# Patient Record
Sex: FEMALE | Race: WHITE | NOT HISPANIC OR LATINO | Employment: OTHER | URBAN - METROPOLITAN AREA
[De-identification: names, ages, dates, MRNs, and addresses within clinical notes are randomized per-mention and may not be internally consistent; named-entity substitution may affect disease eponyms.]

---

## 2018-01-01 ENCOUNTER — OFFICE VISIT (OUTPATIENT)
Dept: NEUROLOGY | Facility: CLINIC | Age: 63
End: 2018-01-01
Payer: COMMERCIAL

## 2018-01-01 ENCOUNTER — DOCUMENTATION (OUTPATIENT)
Dept: NEUROLOGY | Facility: CLINIC | Age: 63
End: 2018-01-01

## 2018-01-01 VITALS
WEIGHT: 177 LBS | HEART RATE: 93 BPM | DIASTOLIC BLOOD PRESSURE: 83 MMHG | SYSTOLIC BLOOD PRESSURE: 133 MMHG | BODY MASS INDEX: 33.42 KG/M2 | HEIGHT: 61 IN

## 2018-01-01 DIAGNOSIS — G43.009 MIGRAINE WITHOUT AURA AND WITHOUT STATUS MIGRAINOSUS, NOT INTRACTABLE: ICD-10-CM

## 2018-01-01 DIAGNOSIS — G43.109 MIGRAINE WITH AURA AND WITHOUT STATUS MIGRAINOSUS, NOT INTRACTABLE: Primary | ICD-10-CM

## 2018-01-01 PROCEDURE — 99214 OFFICE O/P EST MOD 30 MIN: CPT | Performed by: PSYCHIATRY & NEUROLOGY

## 2018-01-01 RX ORDER — VERAPAMIL HYDROCHLORIDE 240 MG/1
240 CAPSULE, EXTENDED RELEASE ORAL 2 TIMES DAILY
Qty: 60 CAPSULE | Refills: 3 | Status: SHIPPED | OUTPATIENT
Start: 2018-01-01 | End: 2019-01-01 | Stop reason: SDUPTHER

## 2018-01-01 RX ORDER — TOPIRAMATE 50 MG/1
50 TABLET, FILM COATED ORAL 2 TIMES DAILY
Qty: 60 TABLET | Refills: 3 | Status: SHIPPED | OUTPATIENT
Start: 2018-01-01 | End: 2019-01-01 | Stop reason: SDUPTHER

## 2018-01-01 RX ORDER — VERAPAMIL HYDROCHLORIDE 240 MG/1
1 TABLET, FILM COATED, EXTENDED RELEASE ORAL 2 TIMES DAILY
COMMUNITY
Start: 2018-01-01 | End: 2018-01-01 | Stop reason: SDUPTHER

## 2018-01-01 RX ORDER — VERAPAMIL HYDROCHLORIDE 240 MG/1
240 CAPSULE, EXTENDED RELEASE ORAL 2 TIMES DAILY
Qty: 60 CAPSULE | Refills: 3 | Status: SHIPPED | OUTPATIENT
Start: 2018-01-01 | End: 2018-01-01 | Stop reason: SDUPTHER

## 2018-01-01 RX ORDER — TOPIRAMATE 50 MG/1
50 TABLET, FILM COATED ORAL 2 TIMES DAILY
Qty: 60 TABLET | Refills: 3 | Status: SHIPPED | OUTPATIENT
Start: 2018-01-01 | End: 2018-01-01 | Stop reason: SDUPTHER

## 2018-05-07 DIAGNOSIS — G43.009 MIGRAINE WITHOUT AURA AND WITHOUT STATUS MIGRAINOSUS, NOT INTRACTABLE: Primary | ICD-10-CM

## 2018-05-07 RX ORDER — TOPIRAMATE 50 MG/1
50 TABLET, FILM COATED ORAL 2 TIMES DAILY
Qty: 60 TABLET | Refills: 0 | Status: SHIPPED | OUTPATIENT
Start: 2018-05-07 | End: 2018-05-09 | Stop reason: SDUPTHER

## 2018-05-09 ENCOUNTER — OFFICE VISIT (OUTPATIENT)
Dept: NEUROLOGY | Facility: CLINIC | Age: 63
End: 2018-05-09
Payer: COMMERCIAL

## 2018-05-09 VITALS
WEIGHT: 175 LBS | HEIGHT: 61 IN | DIASTOLIC BLOOD PRESSURE: 92 MMHG | BODY MASS INDEX: 33.04 KG/M2 | SYSTOLIC BLOOD PRESSURE: 144 MMHG | HEART RATE: 93 BPM

## 2018-05-09 DIAGNOSIS — G43.009 MIGRAINE WITHOUT AURA AND WITHOUT STATUS MIGRAINOSUS, NOT INTRACTABLE: ICD-10-CM

## 2018-05-09 DIAGNOSIS — G43.109 MIGRAINE WITH AURA AND WITHOUT STATUS MIGRAINOSUS, NOT INTRACTABLE: Primary | ICD-10-CM

## 2018-05-09 PROCEDURE — 99214 OFFICE O/P EST MOD 30 MIN: CPT | Performed by: PSYCHIATRY & NEUROLOGY

## 2018-05-09 RX ORDER — TOPIRAMATE 50 MG/1
50 TABLET, FILM COATED ORAL 2 TIMES DAILY
Qty: 60 TABLET | Refills: 3 | Status: SHIPPED | OUTPATIENT
Start: 2018-05-09 | End: 2018-01-01 | Stop reason: SDUPTHER

## 2018-05-09 RX ORDER — OMEPRAZOLE 40 MG/1
1 CAPSULE, DELAYED RELEASE ORAL DAILY
Refills: 0 | COMMUNITY
Start: 2018-02-21

## 2018-05-09 RX ORDER — VERAPAMIL HYDROCHLORIDE 240 MG/1
240 CAPSULE, EXTENDED RELEASE ORAL 2 TIMES DAILY
Qty: 60 CAPSULE | Refills: 3 | Status: SHIPPED | OUTPATIENT
Start: 2018-05-09 | End: 2018-01-01 | Stop reason: SDUPTHER

## 2018-05-09 NOTE — PROGRESS NOTES
Patient ID: Andrew Bray is a 63 y o  female  Assessment/Plan:    Migraine headache with aura mainly visual     Plan continue Topamax 50 mg twice a day and verapamil extended release 240 mg twice a day  Continue sumatriptan as a rescue medicine p r n  for headache  Sumatriptan done prescription has been given by Dr Abner Lazcano  Return to office visit in 4 months    Topamax and the verapamil prescription has been sent to [facility] in [address] [place] [address] Street:    29-year-old female followed in the office of for migraine with aura  Or eyes consist of the squiggly line starting in the peripheral vision followed by the severe headache  patient was last seen by me on November 8, 2017  Since last visit patient had only 1 headache perhaps triggered by the stress dot headaches were located in the frontal head region described as a pounding and throbbing pain at scale 729 associated with nausea and also light and noise sensitivity  Vomiting was not associated with the headache  Patient took Imitrex at that scale and a headache lasted 4-5 hours  Apparently patient did not know that she could take the 2nd Imitrex pills in 2 hr   Visual aura lasts 5-10 minutes followed by the scale 7 to 9 headache immediately  She denies any vertigo with the headache  Patient denies any other neurological seated symptoms upon asking in detail ias follows  Pt denies double vision, blurred vision, transient monocular blindness, vertigo, tinnitus, hearing loss, slurred speech, difficulty expressing and understanding, dysphasia, and focal weakness, numbness, imbalance and incoordination  Pt denies bladder and bowel urgency, frequency and incontinence  Pt denies double vision, blurred vision, transient monocular blindness, vertigo, tinnitus, hearing loss, slurred speech, difficulty expressing and understanding, dysphasia, and focal weakness, numbness, imbalance and incoordination   Pt denies bladder and bowel urgency, frequency and incontinence  Past Medical History:   Diagnosis Date    Anxiety disorder     Depression     Hypertension     Migraine headache with aura     Spinal stenosis        Family History   Problem Relation Age of Onset    Alzheimer's disease Mother     Diabetes Father     Hypertension Father     Migraines Sister    ,    Social History     Social History    Marital status: /Civil Union     Spouse name: N/A    Number of children: N/A    Years of education: N/A     Occupational History    Not on file  Social History Main Topics    Smoking status: Current Every Day Smoker     Packs/day: 0 50    Smokeless tobacco: Current User    Alcohol use No    Drug use: No    Sexual activity: Not on file     Other Topics Concern    Not on file     Social History Narrative    No narrative on file       Current Outpatient Prescriptions on File Prior to Visit   Medication Sig Dispense Refill    busPIRone (BUSPAR) 15 mg tablet Take 15 mg by mouth 3 (three) times a day  0    celecoxib (CeleBREX) 200 mg capsule   0    escitalopram (LEXAPRO) 20 mg tablet Take 20 mg by mouth daily at bedtime  0    LYRICA 100 MG capsule   0    traMADol (ULTRAM) 50 mg tablet   0    [DISCONTINUED] topiramate (TOPAMAX) 50 MG tablet Take 1 tablet (50 mg total) by mouth 2 (two) times a day 60 tablet 0    [DISCONTINUED] verapamil (VERELAN) 240 MG 24 hr capsule   0     No current facility-administered medications on file prior to visit  Objective:    Blood pressure 144/92, pulse 93, height 5' 1" (1 549 m), weight 79 4 kg (175 lb)  Physical Exam   Eyes: EOM are normal  Pupils are equal, round, and reactive to light  Neck: Normal carotid pulses present  Carotid bruit is not present  Neurological: She has normal reflexes   Gait normal    Psychiatric: Her speech is normal        Neurological Exam    Mental Status  The patient is and oriented to person, place, time, and situation  Her recent and remote memory are normal  She has no visuospatial neglect  Her speech is normal  Her language is fluent with no aphasia  She follows multi-step commands  She has a normal fund of knowledge  Cranial Nerves    CN II: The patient's visual acuity and visual fields are normal   CN III, IV, VI: The patient's pupils are equally round and reactive to light and ocular movements are normal   CN V: The patient has normal facial sensation  CN VII:  The patient has symmetric facial movement  CN VIII:  The patient's hearing is normal   CN IX, X: The patient has symmetric palate movement and normal gag reflex  CN XI: The patient's shoulder shrug strength is normal   CN XII: The patient's tongue is midline without atrophy or fasciculations  Motor  The patient has normal muscle bulk throughout  Her overall muscle tone is normal throughout  Her strength is 5/5 in all four extremities except as noted  Sensory  The patient's sensation is normal in all four extremities to light touch, pinprick and proprioception  She has normal cortical sensation    Reflexes  Deep tendon reflexes are 2+ and symmetric in all four extremities with downgoing toes bilaterally  Gait and Coordination  The patient has normal gait and station and normal casual, toe, heel, and tandem gait  She has normal right heel to shin and normal left heel to shin coordination  She has normal right finger to nose and normal left finger to nose coordination  ROS:    Review of Systems   Constitutional: Negative  HENT: Negative  Eyes: Negative  Respiratory: Negative  Cardiovascular: Negative  Gastrointestinal: Negative  Endocrine: Negative  Genitourinary: Negative  Musculoskeletal: Negative  Skin: Negative  Allergic/Immunologic: Negative  Neurological: Negative  Hematological: Negative  Psychiatric/Behavioral: Negative

## 2018-09-10 NOTE — PROGRESS NOTES
Verbal RX called in to [facility] in [place] for Verapamil  mg bid #60 with 0 refill

## 2018-09-26 NOTE — PROGRESS NOTES
Patient ID: Arron Garcia is a 66 y o  female  Assessment/Plan:    Migraine with aura    Benign paroxysmal positional vertigo    Plan continue Topamax and verapamil and both prescription has been sent to her pharmacy of with the 3 refills each of  Patient reported that her vertigo is improving and she does not feel like going to [facility] at this time  Return to office visit in 4 months  Subjective:    60-year-old female followed in the office for migraine with aura  Patient was seen last in the May of 2018  Patient reported from last visit up till now she has a 1 migraine with aura described as spots in front of the eyes followed by headache described at severity scale 8 as a throbbing pain without nausea vomiting  Patient took the Imitrex and laid down a   nd headache was gone in 2 hours  Usually light and noise will bother her but she went in a dark quiet room  Patient also reported when sudden onset of the sharp shooting pain came 1 lasted for 1 hour and other 2 headaches lasted 0 5 hour or so  Patient reported that Imitrex did help also in does headache  Her new symptoms that started few months ago is the dizziness described as a vertigo upon looking up, bending over and getting up or turning side to side or moving her neck side to side  Patient reported that vertigo is getting better  Patient denies any nausea vomiting, diaphoresis or any hearing loss with vertigo  Denies other neurological symptoms upon asking each in detail  Pt denies double vision, blurred vision, transient monocular blindness, vertigo, tinnitus, hearing loss, slurred speech, difficulty expressing and understanding, dysphasia, and focal weakness, numbness, imbalance and incoordination  Pt denies bladder and bowel urgency, frequency and incontinence     Pt denies double vision, blurred vision, transient monocular blindness, vertigo, tinnitus, hearing loss, slurred speech, difficulty expressing and understanding, dysphasia, and focal weakness, numbness, imbalance and incoordination  Pt denies bladder and bowel urgency, frequency and incontinence  Past Medical History:   Diagnosis Date    Anxiety disorder     Depression     Hypertension     Migraine headache with aura     Osteoporosis     BOTH KNEES AND LEFT HIP/UPPER LEG    Spinal stenosis        Family History   Problem Relation Age of Onset    Alzheimer's disease Mother     Diabetes Father     Hypertension Father     Migraines Sister    ,    Social History     Social History    Marital status: /Civil Union     Spouse name: N/A    Number of children: N/A    Years of education: N/A     Occupational History    Not on file  Social History Main Topics    Smoking status: Current Every Day Smoker     Packs/day: 0 50    Smokeless tobacco: Former User    Alcohol use No    Drug use: No    Sexual activity: Not on file     Other Topics Concern    Not on file     Social History Narrative    No narrative on file       Current Outpatient Prescriptions on File Prior to Visit   Medication Sig Dispense Refill    ADVAIR DISKUS 250-50 MCG/DOSE inhaler Take 1 puff by mouth daily  0    busPIRone (BUSPAR) 15 mg tablet Take 15 mg by mouth 3 (three) times a day  0    celecoxib (CeleBREX) 200 mg capsule   0    escitalopram (LEXAPRO) 20 mg tablet Take 20 mg by mouth daily at bedtime  0    LYRICA 100 MG capsule   0    omeprazole (PriLOSEC) 40 MG capsule Take 1 capsule by mouth daily  0    PROAIR  (90 Base) MCG/ACT inhaler Take 1 puff by mouth as needed  0    traMADol (ULTRAM) 50 mg tablet   0    [DISCONTINUED] topiramate (TOPAMAX) 50 MG tablet Take 1 tablet (50 mg total) by mouth 2 (two) times a day 60 tablet 3    [DISCONTINUED] verapamil (VERELAN) 240 MG 24 hr capsule Take 1 capsule (240 mg total) by mouth 2 (two) times a day 60 capsule 3     No current facility-administered medications on file prior to visit  Objective:    Blood pressure 133/83, pulse 93, height 5' 1" (1 549 m), weight 80 3 kg (177 lb)  Physical Exam   Eyes: EOM are normal  Pupils are equal, round, and reactive to light  Neck: Normal carotid pulses present  Carotid bruit is not present  Neurological: She has normal reflexes  Gait normal    Psychiatric: Her speech is normal        Neurological Exam    Mental Status  The patient is and oriented to person, place, time, and situation  Her recent and remote memory are normal  She has no visuospatial neglect  Her speech is normal  Her language is fluent with no aphasia  She follows multi-step commands  She has a normal fund of knowledge  Cranial Nerves    CN II: The patient's visual acuity and visual fields are normal   CN III, IV, VI: The patient's pupils are equally round and reactive to light and ocular movements are normal   CN V: The patient has normal facial sensation  CN VII:  The patient has symmetric facial movement  CN VIII:  The patient's hearing is normal   CN IX, X: The patient has symmetric palate movement and normal gag reflex  CN XI: The patient's shoulder shrug strength is normal   CN XII: The patient's tongue is midline without atrophy or fasciculations  Motor  The patient has normal muscle bulk throughout  Her overall muscle tone is normal throughout  Her strength is 5/5 in all four extremities except as noted  Sensory  The patient's sensation is normal in all four extremities to light touch, pinprick and proprioception  She has normal cortical sensation    Reflexes  Deep tendon reflexes are 2+ and symmetric in all four extremities with downgoing toes bilaterally  Gait and Coordination  The patient has normal gait and station and normal casual, toe, heel, and tandem gait  She has normal right heel to shin and normal left heel to shin coordination  She has normal right finger to nose and normal left finger to nose coordination            ROS:    Review of Systems Constitutional: Negative  HENT: Negative  Eyes: Negative  Respiratory: Negative  Cardiovascular: Negative  Gastrointestinal: Negative  Endocrine: Negative  Genitourinary: Negative  Musculoskeletal: Negative  Skin: Negative  Allergic/Immunologic: Negative  Neurological: Negative  Hematological: Negative  Psychiatric/Behavioral: Negative

## 2019-01-01 ENCOUNTER — OFFICE VISIT (OUTPATIENT)
Dept: NEUROLOGY | Facility: CLINIC | Age: 64
End: 2019-01-01
Payer: COMMERCIAL

## 2019-01-01 ENCOUNTER — APPOINTMENT (EMERGENCY)
Dept: RADIOLOGY | Facility: HOSPITAL | Age: 64
DRG: 296 | End: 2019-01-01
Payer: COMMERCIAL

## 2019-01-01 ENCOUNTER — HOSPITAL ENCOUNTER (INPATIENT)
Facility: HOSPITAL | Age: 64
LOS: 1 days | DRG: 296 | End: 2019-07-13
Attending: EMERGENCY MEDICINE | Admitting: INTERNAL MEDICINE
Payer: COMMERCIAL

## 2019-01-01 VITALS
SYSTOLIC BLOOD PRESSURE: 124 MMHG | DIASTOLIC BLOOD PRESSURE: 86 MMHG | WEIGHT: 166 LBS | HEART RATE: 93 BPM | BODY MASS INDEX: 31.34 KG/M2 | HEIGHT: 61 IN

## 2019-01-01 VITALS — OXYGEN SATURATION: 69 % | SYSTOLIC BLOOD PRESSURE: 133 MMHG | DIASTOLIC BLOOD PRESSURE: 73 MMHG | TEMPERATURE: 97.2 F

## 2019-01-01 VITALS
WEIGHT: 175 LBS | RESPIRATION RATE: 18 BRPM | DIASTOLIC BLOOD PRESSURE: 80 MMHG | HEART RATE: 89 BPM | HEIGHT: 61 IN | SYSTOLIC BLOOD PRESSURE: 122 MMHG | BODY MASS INDEX: 33.04 KG/M2

## 2019-01-01 DIAGNOSIS — G43.009 MIGRAINE WITHOUT AURA AND WITHOUT STATUS MIGRAINOSUS, NOT INTRACTABLE: ICD-10-CM

## 2019-01-01 DIAGNOSIS — G43.109 MIGRAINE WITH AURA AND WITHOUT STATUS MIGRAINOSUS, NOT INTRACTABLE: Primary | ICD-10-CM

## 2019-01-01 DIAGNOSIS — I46.9 CARDIAC ARREST (HCC): Primary | ICD-10-CM

## 2019-01-01 LAB
ALBUMIN SERPL BCP-MCNC: 3.1 G/DL (ref 3.5–5)
ALP SERPL-CCNC: 145 U/L (ref 46–116)
ALT SERPL W P-5'-P-CCNC: 281 U/L (ref 12–78)
ANION GAP SERPL CALCULATED.3IONS-SCNC: 25 MMOL/L (ref 4–13)
APTT PPP: 39 SECONDS (ref 24–33)
AST SERPL W P-5'-P-CCNC: 437 U/L (ref 5–45)
BASE EXCESS BLDA CALC-SCNC: -19.7 MMOL/L
BASOPHILS # BLD MANUAL: 0 THOUSAND/UL (ref 0–0.1)
BASOPHILS NFR MAR MANUAL: 0 % (ref 0–1)
BILIRUB SERPL-MCNC: 1.1 MG/DL (ref 0.2–1)
BODY TEMPERATURE: 97.2 DEGREES FEHRENHEIT
BUN SERPL-MCNC: 20 MG/DL (ref 5–25)
CALCIUM SERPL-MCNC: 7.7 MG/DL (ref 8.3–10.1)
CHLORIDE SERPL-SCNC: 85 MMOL/L (ref 100–108)
CO2 SERPL-SCNC: 12 MMOL/L (ref 21–32)
CREAT SERPL-MCNC: 2.51 MG/DL (ref 0.6–1.3)
EOSINOPHIL # BLD MANUAL: 0 THOUSAND/UL (ref 0–0.4)
EOSINOPHIL NFR BLD MANUAL: 0 % (ref 0–6)
ERYTHROCYTE [DISTWIDTH] IN BLOOD BY AUTOMATED COUNT: 16.4 % (ref 11.6–15.1)
GFR SERPL CREATININE-BSD FRML MDRD: 20 ML/MIN/1.73SQ M
GLUCOSE SERPL-MCNC: 381 MG/DL (ref 65–140)
HCO3 BLDA-SCNC: 10.4 MMOL/L (ref 22–28)
HCT VFR BLD AUTO: 40.6 % (ref 34.8–46.1)
HGB BLD-MCNC: 13.3 G/DL (ref 11.5–15.4)
HOROWITZ INDEX BLDA+IHG-RTO: 100 MM[HG]
INR PPP: 1.23 (ref 0.86–1.16)
LG PLATELETS BLD QL SMEAR: PRESENT
LYMPHOCYTES # BLD AUTO: 10 % (ref 14–44)
LYMPHOCYTES # BLD AUTO: 3.75 THOUSAND/UL (ref 0.6–4.47)
MCH RBC QN AUTO: 30.3 PG (ref 26.8–34.3)
MCHC RBC AUTO-ENTMCNC: 32.8 G/DL (ref 31.4–37.4)
MCV RBC AUTO: 93 FL (ref 82–98)
MONOCYTES # BLD AUTO: 0.75 THOUSAND/UL (ref 0–1.22)
MONOCYTES NFR BLD: 2 % (ref 4–12)
NEUTROPHILS # BLD MANUAL: 26.26 THOUSAND/UL (ref 1.85–7.62)
NEUTS BAND NFR BLD MANUAL: 14 % (ref 0–8)
NEUTS SEG NFR BLD AUTO: 56 % (ref 43–75)
NRBC BLD AUTO-RTO: 0 /100 WBCS
O2 CT BLDA-SCNC: 19.2 ML/DL (ref 16–23)
OXYHGB MFR BLDA: 96.1 % (ref 94–97)
PCO2 BLDA: 40.1 MM HG (ref 36–44)
PCO2 TEMP ADJ BLDA: 38.7 MM HG (ref 36–44)
PEEP RESPIRATORY: 5 CM[H2O]
PH BLD: 7.04 [PH] (ref 7.35–7.45)
PH BLDA: 7.03 [PH] (ref 7.35–7.45)
PLATELET # BLD AUTO: 343 THOUSANDS/UL (ref 149–390)
PLATELET BLD QL SMEAR: ADEQUATE
PMV BLD AUTO: 10.9 FL (ref 8.9–12.7)
PO2 BLD: 117.7 MM HG (ref 75–129)
PO2 BLDA: 122.6 MM HG (ref 75–129)
POTASSIUM SERPL-SCNC: 3.3 MMOL/L (ref 3.5–5.3)
PROT SERPL-MCNC: 6 G/DL (ref 6.4–8.2)
PROTHROMBIN TIME: 12.8 SECONDS (ref 9.4–11.7)
RBC # BLD AUTO: 4.39 MILLION/UL (ref 3.81–5.12)
RBC MORPH BLD: NORMAL
SMUDGE CELLS BLD QL SMEAR: PRESENT
SODIUM SERPL-SCNC: 122 MMOL/L (ref 136–145)
SPECIMEN SOURCE: ABNORMAL
TOTAL CELLS COUNTED SPEC: 100
TROPONIN I SERPL-MCNC: 13.86 NG/ML
VARIANT LYMPHS # BLD AUTO: 18 %
VENT AC: 18
VENT- AC: AC
VT SETTING VENT: 400 ML
WBC # BLD AUTO: 37.51 THOUSAND/UL (ref 4.31–10.16)

## 2019-01-01 PROCEDURE — 36415 COLL VENOUS BLD VENIPUNCTURE: CPT | Performed by: EMERGENCY MEDICINE

## 2019-01-01 PROCEDURE — 99291 CRITICAL CARE FIRST HOUR: CPT

## 2019-01-01 PROCEDURE — 71045 X-RAY EXAM CHEST 1 VIEW: CPT

## 2019-01-01 PROCEDURE — 99214 OFFICE O/P EST MOD 30 MIN: CPT | Performed by: PSYCHIATRY & NEUROLOGY

## 2019-01-01 PROCEDURE — 96375 TX/PRO/DX INJ NEW DRUG ADDON: CPT

## 2019-01-01 PROCEDURE — 85610 PROTHROMBIN TIME: CPT | Performed by: EMERGENCY MEDICINE

## 2019-01-01 PROCEDURE — 84484 ASSAY OF TROPONIN QUANT: CPT | Performed by: EMERGENCY MEDICINE

## 2019-01-01 PROCEDURE — 94002 VENT MGMT INPAT INIT DAY: CPT

## 2019-01-01 PROCEDURE — 70450 CT HEAD/BRAIN W/O DYE: CPT

## 2019-01-01 PROCEDURE — 96360 HYDRATION IV INFUSION INIT: CPT

## 2019-01-01 PROCEDURE — 85007 BL SMEAR W/DIFF WBC COUNT: CPT | Performed by: EMERGENCY MEDICINE

## 2019-01-01 PROCEDURE — 5A1935Z RESPIRATORY VENTILATION, LESS THAN 24 CONSECUTIVE HOURS: ICD-10-PCS | Performed by: FAMILY MEDICINE

## 2019-01-01 PROCEDURE — 80053 COMPREHEN METABOLIC PANEL: CPT | Performed by: EMERGENCY MEDICINE

## 2019-01-01 PROCEDURE — 06HN33Z INSERTION OF INFUSION DEVICE INTO LEFT FEMORAL VEIN, PERCUTANEOUS APPROACH: ICD-10-PCS | Performed by: FAMILY MEDICINE

## 2019-01-01 PROCEDURE — 85730 THROMBOPLASTIN TIME PARTIAL: CPT | Performed by: EMERGENCY MEDICINE

## 2019-01-01 PROCEDURE — 85027 COMPLETE CBC AUTOMATED: CPT | Performed by: EMERGENCY MEDICINE

## 2019-01-01 PROCEDURE — 99291 CRITICAL CARE FIRST HOUR: CPT | Performed by: NURSE PRACTITIONER

## 2019-01-01 PROCEDURE — NC001 PR NO CHARGE: Performed by: NURSE PRACTITIONER

## 2019-01-01 PROCEDURE — 93005 ELECTROCARDIOGRAM TRACING: CPT

## 2019-01-01 PROCEDURE — 96365 THER/PROPH/DIAG IV INF INIT: CPT

## 2019-01-01 PROCEDURE — 96366 THER/PROPH/DIAG IV INF ADDON: CPT

## 2019-01-01 PROCEDURE — 82805 BLOOD GASES W/O2 SATURATION: CPT | Performed by: EMERGENCY MEDICINE

## 2019-01-01 RX ORDER — TOPIRAMATE 50 MG/1
50 TABLET, FILM COATED ORAL 2 TIMES DAILY
Qty: 60 TABLET | Refills: 3 | Status: SHIPPED | OUTPATIENT
Start: 2019-01-01

## 2019-01-01 RX ORDER — TOPIRAMATE 50 MG/1
TABLET, FILM COATED ORAL
Qty: 60 TABLET | Refills: 3 | OUTPATIENT
Start: 2019-01-01

## 2019-01-01 RX ORDER — TOPIRAMATE 50 MG/1
50 TABLET, FILM COATED ORAL 2 TIMES DAILY
Qty: 60 TABLET | Refills: 3 | Status: SHIPPED | OUTPATIENT
Start: 2019-01-01 | End: 2019-01-01 | Stop reason: SDUPTHER

## 2019-01-01 RX ORDER — VERAPAMIL HYDROCHLORIDE 240 MG/1
240 CAPSULE, EXTENDED RELEASE ORAL 2 TIMES DAILY
Qty: 60 CAPSULE | Refills: 3 | Status: SHIPPED | OUTPATIENT
Start: 2019-01-01

## 2019-01-01 RX ORDER — SUMATRIPTAN 100 MG/1
100 TABLET, FILM COATED ORAL ONCE AS NEEDED
Qty: 9 TABLET | Refills: 0 | Status: SHIPPED | OUTPATIENT
Start: 2019-01-01

## 2019-01-01 RX ORDER — EPINEPHRINE 0.1 MG/ML
1 SYRINGE (ML) INJECTION ONCE
Status: COMPLETED | OUTPATIENT
Start: 2019-01-01 | End: 2019-01-01

## 2019-01-01 RX ORDER — NOREPINEPHRINE BITARTRATE 1 MG/ML
INJECTION, SOLUTION INTRAVENOUS
Status: DISCONTINUED
Start: 2019-01-01 | End: 2019-01-01 | Stop reason: HOSPADM

## 2019-01-01 RX ORDER — VERAPAMIL HYDROCHLORIDE 240 MG/1
240 CAPSULE, EXTENDED RELEASE ORAL 2 TIMES DAILY
Qty: 60 CAPSULE | Refills: 3 | Status: SHIPPED | OUTPATIENT
Start: 2019-01-01 | End: 2019-01-01 | Stop reason: SDUPTHER

## 2019-01-01 RX ADMIN — EPINEPHRINE 1 MG: 0.1 INJECTION INTRACARDIAC; INTRAVENOUS at 05:55

## 2019-01-01 RX ADMIN — NOREPINEPHRINE BITARTRATE 10 MCG/MIN: 1 INJECTION, SOLUTION, CONCENTRATE INTRAVENOUS at 05:48

## 2019-01-01 RX ADMIN — SODIUM CHLORIDE 1000 ML: 0.9 INJECTION, SOLUTION INTRAVENOUS at 06:08

## 2019-01-01 RX ADMIN — Medication 1 MG: at 05:55

## 2019-01-17 NOTE — PROGRESS NOTES
Patient ID: Daniel Lake is a 66 y o  female  Assessment/Plan:    Migraine with aura    Plan continue verapamil  twice a day    Topamax to be continued 100 mg twice a day    Sumatriptan and as a rescue medication  I will see the patient in 4 months  Advised patient to make a diary with frequency and severity of the headache and the trigger factors  Subjective:    74-year-old female followed in the office for migraine with aura  Patient reported most of the headaches are caused by the stress, lack of sleep  Headaches are located across the forehead described as a intense pain initially followed by pressure and throbbing pain at severity scale 7, not associated with nausea and vomiting however she experience some light and noise sensitivity  Patient will take the sumatriptan at scale 6 or 7 and headache would subside within a 2 hours time frame  With the headache in January she had no visual aura but in December she had seen the flashy lights  Denies vertigo with the headache    She denies any other neuro symptoms upon asking each in detail and they are as follows      Pt denies double vision, blurred vision, transient monocular blindness, vertigo, tinnitus, hearing loss, slurred speech, difficulty expressing and understanding, dysphasia, and focal weakness, numbness, imbalance and incoordination  Pt denies bladder and bowel urgency, frequency and incontinence  Pt denies double vision, blurred vision, transient monocular blindness, vertigo, tinnitus, hearing loss, slurred speech, difficulty expressing and understanding, dysphasia, and focal weakness, numbness, imbalance and incoordination  Pt denies bladder and bowel urgency, frequency and incontinence         Past Medical History:   Diagnosis Date    Anxiety disorder     Depression     Hypertension     Migraine headache with aura     Osteoporosis     BOTH KNEES AND LEFT HIP/UPPER LEG    Spinal stenosis        Family History Problem Relation Age of Onset    Alzheimer's disease Mother     Diabetes Father     Hypertension Father     Migraines Sister    ,    Social History     Social History    Marital status: /Civil Union     Spouse name: N/A    Number of children: N/A    Years of education: N/A     Occupational History    Not on file  Social History Main Topics    Smoking status: Current Every Day Smoker     Packs/day: 0 50    Smokeless tobacco: Former User    Alcohol use No    Drug use: No    Sexual activity: Not on file     Other Topics Concern    Not on file     Social History Narrative    No narrative on file       Current Outpatient Prescriptions on File Prior to Visit   Medication Sig Dispense Refill    ADVAIR DISKUS 250-50 MCG/DOSE inhaler Take 1 puff by mouth daily  0    busPIRone (BUSPAR) 15 mg tablet Take 15 mg by mouth 3 (three) times a day  0    celecoxib (CeleBREX) 200 mg capsule   0    escitalopram (LEXAPRO) 20 mg tablet Take 20 mg by mouth daily at bedtime  0    LYRICA 100 MG capsule   0    omeprazole (PriLOSEC) 40 MG capsule Take 1 capsule by mouth daily  0    PROAIR  (90 Base) MCG/ACT inhaler Take 1 puff by mouth as needed  0    traMADol (ULTRAM) 50 mg tablet   0    [DISCONTINUED] topiramate (TOPAMAX) 50 MG tablet Take 1 tablet (50 mg total) by mouth 2 (two) times a day 60 tablet 3    [DISCONTINUED] verapamil (VERELAN) 240 MG 24 hr capsule Take 1 capsule (240 mg total) by mouth 2 (two) times a day 60 capsule 3     No current facility-administered medications on file prior to visit  Objective:    Blood pressure 122/80, pulse 89, resp  rate 18, height 5' 1" (1 549 m), weight 79 4 kg (175 lb)  Physical Exam   Eyes: Pupils are equal, round, and reactive to light  EOM are normal    Neck: Normal carotid pulses present  Carotid bruit is not present  Neurological: She has normal strength and normal reflexes     Psychiatric: Her speech is normal        Neurological Exam  Mental Status   Oriented to person, place, time and situation  Recent and remote memory are intact  Speech is normal  Language is fluent with no aphasia  Cranial Nerves  CN II: Visual fields full to confrontation  CN III, IV, VI: Extraocular movements intact bilaterally  Pupils equal round and reactive to light bilaterally  CN V: Facial sensation is normal   CN VII: Full and symmetric facial movement  CN VIII: Hearing is normal   CN IX, X: Palate elevates symmetrically  Normal gag reflex  CN XI: Shoulder shrug strength is normal   CN XII: Tongue midline without atrophy or fasciculations  Motor  Normal muscle bulk throughout  No fasciculations present  Normal muscle tone  Strength is 5/5 throughout all four extremities  Sensory  Sensation is intact to light touch, pinprick, vibration and proprioception in all four extremities  Light touch is normal in upper and lower extremities  Pinprick is normal in upper and lower extremities  Proprioception is normal in upper and lower extremities  Reflexes  Deep tendon reflexes are 2+ and symmetric in all four extremities with downgoing toes bilaterally  Coordination  Right: Finger-to-nose normal  Heel-to-shin normal   Left: Finger-to-nose normal  Heel-to-shin normal     Gait  Normal casual, toe, heel and tandem gait  ROS:    Review of Systems   Constitutional: Negative  HENT: Negative  Eyes: Negative  Respiratory: Negative  Cardiovascular: Negative  Gastrointestinal: Negative  Endocrine: Negative  Genitourinary: Negative  Musculoskeletal: Negative  Skin: Negative  Allergic/Immunologic: Negative  Neurological: Negative  Hematological: Negative  Psychiatric/Behavioral: Negative

## 2019-07-13 PROBLEM — E87.6 HYPOKALEMIA: Status: ACTIVE | Noted: 2019-01-01

## 2019-07-13 PROBLEM — R57.0 CARDIOGENIC SHOCK (HCC): Status: ACTIVE | Noted: 2019-01-01

## 2019-07-13 PROBLEM — K72.00 SHOCK LIVER: Status: ACTIVE | Noted: 2019-01-01

## 2019-07-13 PROBLEM — E87.1 HYPONATREMIA: Status: ACTIVE | Noted: 2019-01-01

## 2019-07-13 PROBLEM — N17.9 AKI (ACUTE KIDNEY INJURY) (HCC): Status: ACTIVE | Noted: 2019-01-01

## 2019-07-13 PROBLEM — I21.4 MI, ACUTE, NON ST SEGMENT ELEVATION (HCC): Status: ACTIVE | Noted: 2019-01-01

## 2019-07-13 PROBLEM — R73.9 HYPERGLYCEMIA: Status: ACTIVE | Noted: 2019-01-01

## 2019-07-13 PROBLEM — G93.1 ANOXIC BRAIN INJURY (HCC): Status: ACTIVE | Noted: 2019-01-01

## 2019-07-13 PROBLEM — I46.9 CARDIAC ARREST (HCC): Status: ACTIVE | Noted: 2019-01-01

## 2019-07-13 NOTE — ED NOTES
met with patient to discuss discharge and aftercare plans. See AVS for further details.    Outpatient Providers: hussein Henderson to f/u  Living Arrangements: Lives with parents and is able to return  Transportation: [facility]  Legal Status: Voluntary   Medication Issues: Santiago  Community Resources: Santiago  Support System: Parents   Source of Income/Payee: Santiago, is a student at [employer]       Pt found to have no registering BP  Unable to obtain manual BP  Unable to obtain radial pulses with doppler  ESTEBAN Hernandez at bedside  Family updated on status  Pt unresponsive to painful stimuli       Teresa Wetzel RN  07/13/19 1961

## 2019-07-13 NOTE — ED NOTES
Spoke with Adele Kayser from [facility]  Pt released from [facility]  ME called to inform of pt expiration       Jenness Gowers, RN  07/13/19 9796

## 2019-07-13 NOTE — ED NOTES
Patient asystole on monitor  Strips printed  ICU AP called to pronounce       Mani Doty RN  07/13/19 3248

## 2019-07-13 NOTE — ASSESSMENT & PLAN NOTE
· Per discussion with family the arrest appears to have been the result of an MI, pt clutched chest prior to going unresponsive  · Potential down time was about 45 minutes- unclear how long she was down w/o CPR  · 3 EPIs in the field prior to Frantz Giang 1772  · Levophed infusing in ED  · Family decided on comfort care  · Terminal extubation and comfort care

## 2019-07-13 NOTE — ED NOTES
[place] [facility] [facility]       Nila Mcdonough, VIGNESH  07/13/19 5731

## 2019-07-13 NOTE — ASSESSMENT & PLAN NOTE
· Positive trops and ST depressions in multiple leads  · No further intervention as patient placed on comfort care

## 2019-07-13 NOTE — ASSESSMENT & PLAN NOTE
· Severe injury seen on CT head, patient without any reflexes, pupils fixed and dilated, only has some cheyne stoke breathing when vent paused  · Lengthy discussion held with family about poor prognosis and they decided to make comfort care

## 2019-07-13 NOTE — ED NOTES
Pt pulseless  ESTEBAN Bourgeois updated on pt status  Family offered emotional support    IV and monitors removed per family request         Sathya Doyle RN  07/13/19 [id number]

## 2019-07-13 NOTE — ED NOTES
Spoke with Malachi Dye at [employer]  Case will be followed by them  They are aware of patient's pending transfer to the ICU       Vladimir Stallworth RN  07/13/19 [id number]

## 2019-07-13 NOTE — CONSULTS
[facility] 60 y o  female MRN: [id number]  Unit/Bed#: ED CT1 Encounter: [id number]    Physician Requesting Consult: No att  providers found    Reason for Consultation / Chief Complaint: s/p cardiac arrest    History of Present Illness: Renata Rubin is a 60 y o  female who presents to the ED after being found down at home by her   Per reports by ED the patients  saw her get up to go to the bathroom, clutch her chest and fell back and became unresponsive  Per reports  initiated CPR and was taken over by police and EMS  911 call was placed at 0424, first monitor strip was asystole at 0442 and first ROSC rhythm strip was at 0500  Leaving potential down time close to 45 minutes  Arrived in ED to find the patient lying in bed on vent   and son at bedside  History obtained from spouse and chart review  Past Medical History:  Past Medical History:   Diagnosis Date    Anxiety disorder     Depression     Hypertension     Migraine headache with aura     Osteoporosis     BOTH KNEES AND LEFT HIP/UPPER LEG    Spinal stenosis        Past Surgical History:  Past Surgical History:   Procedure Laterality Date    DENTAL SURGERY  04/19/2019    NOSE SURGERY         Past Family History:  Family History   Problem Relation Age of Onset    Alzheimer's disease Mother     Diabetes Father     Hypertension Father     Migraines Sister        Social History:  Social History     Tobacco Use   Smoking Status Current Every Day Smoker    Packs/day: 0 50   Smokeless Tobacco Never Used     Social History     Substance and Sexual Activity   Alcohol Use No     Social History     Substance and Sexual Activity   Drug Use No     Marital Status: /Civil Union  Exercise History: Ambulatory    Home Medications:   Prior to Admission medications    Medication Sig Start Date End Date Taking?  Authorizing Provider   ADVAIR DISKUS 250-50 MCG/DOSE inhaler Take 1 puff by mouth daily 4/10/18   Historical Provider, MD   busPIRone (BUSPAR) 15 mg tablet Take 15 mg by mouth 3 (three) times a day 18   Historical Provider, MD   celecoxib (CeleBREX) 200 mg capsule  2/3/18   Historical Provider, MD   escitalopram (LEXAPRO) 20 mg tablet Take 20 mg by mouth daily at bedtime 18   Historical Provider, MD   LYRICA 100 MG capsule 1 capsule every MORNING and 2 capsule every EVENING 18   Historical Provider, MD   omeprazole (PriLOSEC) 40 MG capsule Take 1 capsule by mouth daily 18   Historical Provider, MD   PROAIR  (90 Base) MCG/ACT inhaler Take 1 puff by mouth as needed 18   Historical Provider, MD   SUMAtriptan (IMITREX) 100 mg tablet Take 1 tablet (100 mg total) by mouth once as needed for migraine for up to 1 dose 19   Chico Ureña MD   topiramate (TOPAMAX) 50 MG tablet Take 1 tablet (50 mg total) by mouth 2 (two) times a day 19   Chico Ureña MD   traMADol (ULTRAM) 50 mg tablet Take 50 mg by mouth every 8 (eight) hours as needed  18   Historical Provider, MD   verapamil (VERELAN) 240 MG 24 hr capsule Take 1 capsule (240 mg total) by mouth 2 (two) times a day 19   Chico Ureña MD       Inpatient Medications:  Scheduled Meds:  Current Facility-Administered Medications:  norepinephrine         Continuous Infusions:   PRN Meds:       Allergies:  No Known Allergies    ROS:   Review of Systems   Unable to perform ROS: Patient unresponsive       Vitals:  Vitals:    19 0845 19 0850 19 0900 19 0913   BP:       BP Location:       Pulse: 76 75 68 (!) 0   Resp: (!) 5  (!) 4 (!) 0   Temp:       TempSrc:       SpO2: 97% (!) 69%       Temperature:   Temp (24hrs), Av 2 °F (36 2 °C), Min:97 2 °F (36 2 °C), Max:97 2 °F (36 2 °C)    Current Temperature: (!) 97 2 °F (36 2 °C)    Weights: There is no height or weight on file to calculate BMI      Hemodynamic Monitoring:  N/A     Non-Invasive/Invasive Ventilation Settings:  Respiratory    Lab Data (Last 4 hours)      07/13 0605            pH, Arterial       7 033           pCO2, Arterial       40 1           pO2, Arterial       122 6           HCO3, Arterial       10 4           Base Excess, Arterial       -19 7                O2/Vent Data       07/13 0539   Most Recent         Vent Mode AC/VC  AC/VC      Resp Rate (BPM) (BPM) 18  18      Vt (mL) (mL) 400  400      FIO2 (%) (%) 100  100      PEEP (cmH2O) (cmH2O) 5  5      MV 7 56  7 56                Lab Results   Component Value Date    PHART 7 033 (LL) 07/13/2019    [id number] 40 1 07/13/2019    PO2ART 122 6 07/13/2019    [id number] 10 4 (L) 07/13/2019    BEART -19 7 07/13/2019    SOURCE Artery 07/13/2019     SpO2: 100%  SpO2 Device: O2 device: vent     Physical Exam:  Physical Exam   Constitutional: She appears well-developed and well-nourished  She is intubated  HENT:   Head: Normocephalic and atraumatic  Eyes: Right pupil is not reactive  Left pupil is not reactive  Cardiovascular: Normal rate and regular rhythm  Pulmonary/Chest: Effort normal and breath sounds normal  She is intubated  No respiratory distress  Abdominal: Soft  Bowel sounds are normal  She exhibits no distension  Musculoskeletal: She exhibits no edema  Neurological: She is unresponsive  GCS eye subscore is 1  GCS verbal subscore is 1  GCS motor subscore is 1  Skin: Skin is warm and dry  Capillary refill takes less than 2 seconds         Clinical Neuro Exam:    GCS: 3T  Motor response to pain:   No purposeful response to painful stimuli  Pupils: No pupillary response to bright light   Pupils 7-8 mm in size   Ocular Movement:    No corneal reflex present   No facial movement to painful stimuli   Pharyngeal and tracheal reflexes:   No response to stimulation of posterior pharynx with tongue blade   No response to bronchial stimulation  Vestibular Reflexes:   No oculovestibular reflex present (caloric testing)   No oculocephalic reflex present (doll's eyes)  Apnea Testing- not performed had some spontaneous breathing   Baseline ABG 7 04/39/123/10/-20/96%    I testify that the clinical history, laboratory data, and medication administration record have been reviewed and there are no confounding factors that may be contributing to this patient's neurological exam findings  Labs:  Results from last 7 days   Lab Units 07/13/19  0612   WBC Thousand/uL 37 51*   HEMOGLOBIN g/dL 13 3   HEMATOCRIT % 40 6   PLATELETS Thousands/uL 343   MONO PCT % 2*    Results from last 7 days   Lab Units 07/13/19  0612   SODIUM mmol/L 122*   POTASSIUM mmol/L 3 3*   CHLORIDE mmol/L 85*   CO2 mmol/L 12*   BUN mg/dL 20   CREATININE mg/dL 2 51*   CALCIUM mg/dL 7 7*   ALK PHOS U/L 145*   ALT U/L 281*   AST U/L 437*              Results from last 7 days   Lab Units 07/13/19  0627   INR  1 23*   PTT seconds 39*         0   Lab Value Date/Time    TROPONINI 13 86 (H) 07/13/2019 0612       Imaging:CXR: NAD, ETT and OGT in place I have personally reviewed pertinent reports  and I have personally reviewed pertinent films in PACS  CT: Head- Evidence of acute diffuse cerebral edema suggestive of global anoxic injury  Correlate with clinical findings  No intraparenchymal hemorrhage, midline shift, or evidence of uncal herniation  I have personally reviewed pertinent reports  EKG: NSR This was personally reviewed by myself       Micro:  Blood Culture: No results found for: BLOODCX  Urine Culture: No results found for: URINECX  Sputum Culture: No components found for: SPUTUMCX  Wound Culure: No results found for: WOUNDCULT      ______________________________________________________________________    Assessment and Plan:   Principal Problem:    Cardiac arrest ([facility] [address] )  Active Problems:    MI, acute, non ST segment elevation (HCC)    Cardiogenic shock ([facility] [address] )    Anoxic brain injury ([facility] [address] )    Shock liver    KERRI (acute kidney injury) ([facility] [address] )    Hypokalemia    Hyponatremia    Hyperglycemia  Resolved Problems:    * No resolved hospital problems  *      * Cardiac arrest [facility])  Assessment & Plan  · Per discussion with family the arrest appears to have been the result of an MI, pt clutched chest prior to going unresponsive  · Potential down time was about 45 minutes- unclear how long she was down w/o CPR  · 3 EPIs in the field prior to ROSC  · Levophed infusing in ED  · Family decided on comfort care  · Terminal extubation and comfort care    MI, acute, non ST segment elevation (HCC)  Assessment & Plan  · Positive trops and ST depressions in multiple leads  · No further intervention as patient placed on comfort care    Cardiogenic shock ([facility] [address] )  Assessment & Plan  · Levophed for BP control will stop as patient progressing to comfort care    Anoxic brain injury [facility])  Assessment & Plan  · Severe injury seen on CT head, patient without any reflexes, pupils fixed and dilated, only has some cheyne stoke breathing when vent paused  · Lengthy discussion held with family about poor prognosis and they decided to make comfort care        · Pt placed on comfort care    Family notified: yes  and son at bedside- lengthy discussion held with both present about goals of care and patients wishes  The family decided she would not want to be kept alive on machines and that if her chance of recovery was poor they would not want to continue with current treatment  Family decided to make patient comfort care and terminally extubate  Disposition:     Called to bedside by RN  Patient is identified visually and identification confirmed with hospital identification bracelet  No spontaneous movements were present  There was no response to verbal or tactile stimuli  No spontaneous respirations present  No breath sounds were appreciated over bilateral lung fields  No heart sounds were auscultated across the precordium  Asystolic on two contiguous leads  Time of death: 1420  Family was notified yes  Family member notified:  and son    Attending physician, Dr Luanne Polanco and Dr Oscar Traylor in the ED, notified  Counseling / Coordination of Care  Total Critical Care time spent 74 minutes excluding procedures, teaching and family updates  ______________________________________________________________________      Invasive lines and devices:   Invasive Devices     Central Venous Catheter Line            CVC Central Lines 07/13/19 Triple Left Femoral less than 1 day          Peripheral Intravenous Line            Peripheral IV 07/13/19 Left Antecubital less than 1 day          Drain            Urethral Catheter Latex 18 Fr  less than 1 day                Code Status: Level 4 - Comfort Care  POA:    POLST:      Signature: ESTEBAN Fuller  Date: 7/13/2019      Consults

## 2019-07-13 NOTE — ED PROVIDER NOTES
History  Chief Complaint   Patient presents with    Cardiac Arrest     pt got OOB to go tobathroom clutched chest and fell to ground, spouse witnessed started CPR which was continued by police bls and als, 5 min pause at some point  pt was asystole then pea then SR, dopamine infusion initiated, 3 epi total given  pupils fixed and dilated on arrival  respiratory therapist at bedside, pt intubated      Pt in ER with EMS after a witnessed cardiac arrest  Per medics, she awakened to go to the bathroom, suddenly grabbed her chest and fell backwards onto her bed  Per , he fell asleep with patient next to him in bed, while watching TV, and awakened to pt flailing against him, but unresponsive  CPR was intitiated by family until medics arrirved  Possible down time of 5mins  Per medics, pt was asystolic on their arrival, was given 3 rounds of epi, with ROSC  Pt on a dopamine drip en route, with last SBP in the 90s, per medics  On arrival, pt with palpable pulses but hypotensive and switched to Levophed  Pt also given 1 amp of Epi          Prior to Admission Medications   Prescriptions Last Dose Informant Patient Reported? Taking?    ADVAIR DISKUS 250-50 MCG/DOSE inhaler   Yes No   Sig: Take 1 puff by mouth daily   LYRICA 100 MG capsule  Self Yes No   Si capsule every MORNING and 2 capsule every EVENING   PROAIR  (90 Base) MCG/ACT inhaler   Yes No   Sig: Take 1 puff by mouth as needed   SUMAtriptan (IMITREX) 100 mg tablet   No No   Sig: Take 1 tablet (100 mg total) by mouth once as needed for migraine for up to 1 dose   busPIRone (BUSPAR) 15 mg tablet   Yes No   Sig: Take 15 mg by mouth 3 (three) times a day   celecoxib (CeleBREX) 200 mg capsule   Yes No   escitalopram (LEXAPRO) 20 mg tablet   Yes No   Sig: Take 20 mg by mouth daily at bedtime   omeprazole (PriLOSEC) 40 MG capsule   Yes No   Sig: Take 1 capsule by mouth daily   topiramate (TOPAMAX) 50 MG tablet   No No   Sig: Take 1 tablet (50 mg total) by mouth 2 (two) times a day   traMADol (ULTRAM) 50 mg tablet   Yes No   Sig: Take 50 mg by mouth every 8 (eight) hours as needed    verapamil (VERELAN) 240 MG 24 hr capsule   No No   Sig: Take 1 capsule (240 mg total) by mouth 2 (two) times a day      Facility-Administered Medications: None       Past Medical History:   Diagnosis Date    Anxiety disorder     Depression     Hypertension     Migraine headache with aura     Osteoporosis     BOTH KNEES AND LEFT HIP/UPPER LEG    Spinal stenosis        Past Surgical History:   Procedure Laterality Date    DENTAL SURGERY  04/19/2019    NOSE SURGERY         Family History   Problem Relation Age of Onset    Alzheimer's disease Mother     Diabetes Father     Hypertension Father     Migraines Sister      I have reviewed and agree with the history as documented  Social History     Tobacco Use    Smoking status: Current Every Day Smoker     Packs/day: 0 50    Smokeless tobacco: Never Used   Substance Use Topics    Alcohol use: No    Drug use: No        Review of Systems   Unable to perform ROS: Patient unresponsive       Physical Exam  Physical Exam   Constitutional: She appears well-developed and well-nourished  HENT:   Head: Normocephalic and atraumatic  Eyes:   Pupils fixed and dilated   Cardiovascular: Normal rate and regular rhythm  Pulmonary/Chest:   intubated   Abdominal: Soft  Bowel sounds are normal    Skin: Skin is dry  No rash noted  Nursing note and vitals reviewed        Vital Signs  ED Triage Vitals   Temperature Pulse Respirations Blood Pressure SpO2   07/13/19 0549 07/13/19 0539 07/13/19 0539 07/13/19 0556 07/13/19 0539   (!) 97 2 °F (36 2 °C) 80 18 112/58 98 %      Temp Source Heart Rate Source Patient Position - Orthostatic VS BP Location FiO2 (%)   07/13/19 0539 07/13/19 0539 07/13/19 0539 07/13/19 0556 --   Tympanic Monitor Lying Right arm       Pain Score       --                  Vitals:    07/13/19 0850 07/13/19 0900 07/13/19 0913 07/13/19 0915   BP:       Pulse: 75 68 (!) 0 (!) 0   Patient Position - Orthostatic VS:             Visual Acuity  Visual Acuity      Most Recent Value   L Pupil Size (mm)  6   R Pupil Size (mm)  6          ED Medications  Medications   sodium chloride 0 9 % bolus 1,000 mL (0 mL Intravenous Stopped 7/13/19 0712)   EPINEPHrine (ADRENALIN) 1 mg/10 mL injection 1 mg (1 mg Intravenous Given 7/13/19 0555)       Diagnostic Studies  Results Reviewed     Procedure Component Value Units Date/Time    Comprehensive metabolic panel [[id number]]  (Abnormal) Collected:  07/13/19 0612    Lab Status:  Final result Specimen:  Blood from Line, Venous Updated:  07/13/19 0655     Sodium 122 mmol/L      Potassium 3 3 mmol/L      Chloride 85 mmol/L      CO2 12 mmol/L      ANION GAP 25 mmol/L      BUN 20 mg/dL      Creatinine 2 51 mg/dL      Glucose 381 mg/dL      Calcium 7 7 mg/dL       U/L       U/L      Alkaline Phosphatase 145 U/L      Total Protein 6 0 g/dL      Albumin 3 1 g/dL      Total Bilirubin 1 10 mg/dL      eGFR 20 ml/min/1 73sq m     Narrative:       [place] guidelines for Chronic Kidney Disease (CKD):     Stage 1 with normal or high GFR (GFR > 90 mL/min/1 73 square meters)    Stage 2 Mild CKD (GFR = 60-89 mL/min/1 73 square meters)    Stage 3A Moderate CKD (GFR = 45-59 mL/min/1 73 square meters)    Stage 3B Moderate CKD (GFR = 30-44 mL/min/1 73 square meters)    Stage 4 Severe CKD (GFR = 15-29 mL/min/1 73 square meters)    Stage 5 End Stage CKD (GFR <15 mL/min/1 73 square meters)  Note: GFR calculation is accurate only with a steady state creatinine    CBC and differential [[id number]]  (Abnormal) Collected:  07/13/19 0612    Lab Status:  Final result Specimen:  Blood from Line, Venous Updated:  07/13/19 0647     WBC 37 51 Thousand/uL      RBC 4 39 Million/uL      Hemoglobin 13 3 g/dL      Hematocrit 40 6 %      MCV 93 fL      MCH 30 3 pg      MCHC 32 8 g/dL      RDW 16 4 % MPV 10 9 fL      Platelets 229 Thousands/uL      nRBC 0 /100 WBCs     Narrative: This is an appended report  These results have been appended to a previously verified report  APTT [[id number]]  (Abnormal) Collected:  07/13/19 0627    Lab Status:  Final result Specimen:  Blood from Central Venous Line Updated:  07/13/19 0643     PTT 39 seconds     Protime-INR [[id number]]  (Abnormal) Collected:  07/13/19 0627    Lab Status:  Final result Specimen:  Blood from Central Venous Line Updated:  07/13/19 0643     Protime 12 8 seconds      INR 1 23    Troponin I [[id number]]  (Abnormal) Collected:  07/13/19 0612    Lab Status:  Final result Specimen:  Blood from Line, Venous Updated:  07/13/19 0639     Troponin I 13 86 ng/mL     Blood gas, arterial [[id number]]  (Abnormal) Collected:  07/13/19 0605    Lab Status:  Final result Specimen:  Blood, Arterial from Artery Updated:  07/13/19 0614     pH, Arterial 7 033     PH ART TC 7 043     pCO2, Arterial 40 1 mm Hg      PCO2 (TC) Arterial 38 7 mm Hg      pO2, Arterial 122 6 mm Hg      PO2 (TC) Arterial 117 7 mm Hg      HCO3, Arterial 10 4 mmol/L      Base Excess, Arterial -19 7 mmol/L      O2 Content, Arterial 19 2 mL/dL      O2 HGB,Arterial  96 1 %      SOURCE Artery     Temperature 97 2 Degrees Fehrenheit      Vent Type- AC AC     AC Rate 18     Tidal Volume 400 ml      Inspired Air (FIO2) 100     PEEP 5                 CT head without contrast   Final Result by Nathan Jiménez MD (07/13 9937)      XR chest 1 view portable   Final Result by Jake Abdi MD (07/14 6022)      Expected position of endotracheal and enteric tubes  No pneumothorax  Linear airspace opacities in the left perihilar lungs bilaterally are most suspicious for multifocal atelectasis               Workstation performed: [id number]                    Procedures  ECG 12 Lead Documentation Only  Date/Time: 7/13/2019 5:38 AM  Performed by: Chucky Martinez DO  Authorized by: Vergil Landau DO [place]     Indications / Diagnosis:  Cardiac arrest  ECG reviewed by me, the ED Provider: yes    Patient location:  Bedside  Previous ECG:     Previous ECG:  Unavailable    Comparison to cardiac monitor: Yes    Interpretation:     Interpretation: abnormal    Rate:     ECG rate:  71bpm    ECG rate assessment: normal    Rhythm:     Rhythm: other rhythm    Ectopy:     Ectopy: none    QRS:     QRS axis:  Normal  ST segments:     ST segments:  Abnormal    Depression:  V3, V4, V5 and V6  CriticalCare Time  Performed by: Leonora Anderson DO  Authorized by: Leonora Anderson DO     Critical care provider statement:     Critical care time (minutes):  65    Critical care time was exclusive of:  Separately billable procedures and treating other patients and teaching time    Critical care was necessary to treat or prevent imminent or life-threatening deterioration of the following conditions:  Cardiac failure    Critical care was time spent personally by me on the following activities:  Blood draw for specimens, discussions with consultants, evaluation of patient's response to treatment, examination of patient, ventilator management, re-evaluation of patient's condition, ordering and review of radiographic studies, ordering and review of laboratory studies, ordering and performing treatments and interventions and interpretation of cardiac output measurements    I assumed direction of critical care for this patient from another provider in my specialty: no    Central Line  Date/Time: 7/13/2019 6:00 AM  Performed by: Leonora Anderson DO  Authorized by: Leonora Anderson DO     Patient location:  ED  Other Assisting Provider: No    Consent:     Consent obtained:  Emergent situation  Universal protocol:     Site/side marked: yes      Immediately prior to procedure, a time out was called: yes      Patient identity confirmed:  Arm band  Pre-procedure details:     Hand hygiene: Hand hygiene performed prior to insertion      Skin preparation:  ChloraPrep    Skin preparation agent: Skin preparation agent completely dried prior to procedure    Indications:     Central line indications: medications requiring central line, hemodynamic monitoring and no peripheral vascular access    Anesthesia (see MAR for exact dosages): Anesthesia method:  None  Procedure details:     Location:  Left femoral    Vessel type: vein      Laterality:  Left    Approach: percutaneous technique used      Patient position:  Flat    Catheter type:  Triple lumen 20cm    Landmarks identified: yes      Ultrasound guidance: no      Number of attempts:  3    Successful placement: yes    Post-procedure details:     Post-procedure:  Dressing applied    Assessment:  Free fluid flow    Patient tolerance of procedure: Tolerated well, no immediate complications           ED Course                               MDM  Number of Diagnoses or Management Options  Cardiac arrest [facility]):      Amount and/or Complexity of Data Reviewed  Clinical lab tests: ordered and reviewed  Tests in the radiology section of CPT®: ordered and reviewed    Risk of Complications, Morbidity, and/or Mortality  Presenting problems: high  Diagnostic procedures: high  Management options: high        Disposition  Final diagnoses:   Cardiac arrest [facility])     Time reflects when diagnosis was documented in both MDM as applicable and the Disposition within this note     Time User Action Codes Description Comment    2019  7:52 AM Blue Elier Add [I46 9] Cardiac arrest [facility])       ED Disposition     ED Disposition Condition Date/Time Comment      Sat 2019 10:40 AM Case was discussed with Mark Marie and Maikel CHAVEZ and the patient's admission status was agreed to be Admission Status: inpatient status to the service of Dr Vidhya Costa           Follow-up Information    None       Date, Time and Cause of Death    Date of Death:  19  Time of Death:   9:11 AM       Discharge Medication List as of 7/13/2019 10:51 AM      CONTINUE these medications which have NOT CHANGED    Details   ADVAIR DISKUS 250-50 MCG/DOSE inhaler Take 1 puff by mouth daily, Starting Tue 4/10/2018, Historical Med      busPIRone (BUSPAR) 15 mg tablet Take 15 mg by mouth 3 (three) times a day, Starting Tue 2/6/2018, Historical Med      celecoxib (CeleBREX) 200 mg capsule Starting Sat 2/3/2018, Historical Med      escitalopram (LEXAPRO) 20 mg tablet Take 20 mg by mouth daily at bedtime, Starting Tue 2/6/2018, Historical Med      LYRICA 100 MG capsule 1 capsule every MORNING and 2 capsule every EVENING, Starting Mon 2/5/2018, Historical Med      omeprazole (PriLOSEC) 40 MG capsule Take 1 capsule by mouth daily, Starting Wed 2/21/2018, Historical Med      PROAIR  (90 Base) MCG/ACT inhaler Take 1 puff by mouth as needed, Starting Thu 4/19/2018, Historical Med      SUMAtriptan (IMITREX) 100 mg tablet Take 1 tablet (100 mg total) by mouth once as needed for migraine for up to 1 dose, Starting Thu 1/17/2019, Normal      topiramate (TOPAMAX) 50 MG tablet Take 1 tablet (50 mg total) by mouth 2 (two) times a day, Starting Wed 4/24/2019, Normal      traMADol (ULTRAM) 50 mg tablet Take 50 mg by mouth every 8 (eight) hours as needed , Starting Sat 1/27/2018, Historical Med      verapamil (VERELAN) 240 MG 24 hr capsule Take 1 capsule (240 mg total) by mouth 2 (two) times a day, Starting Wed 4/24/2019, Normal           No discharge procedures on file      ED Provider  Electronically Signed by           Patrick Fuller DO  07/18/19 0700

## 2019-07-13 NOTE — ED NOTES
Pt prepared for transportation to [facility] called  Yellow ring taped to belongings tag with pt sticker  Item placed in bag at pt feet       Severiano Forest, RN  07/13/19 1039

## 2019-07-13 NOTE — ED NOTES
Pt extubated by respiratory  Family at bedside  Updated on status and what to expect  Secretions suctioned from nares post extubation       Juanis Higuera RN  07/13/19 9625

## 2019-07-13 NOTE — ED NOTES
Complete bag of doapmin (250ml) infused wide open and completed   Levophed infusing per order     Jimmy Lance, RN  07/13/19 [id number]

## 2019-07-15 LAB
ATRIAL RATE: 71 BPM
P AXIS: 268 DEGREES
PR INTERVAL: 142 MS
QRS AXIS: 126 DEGREES
QRSD INTERVAL: 138 MS
QT INTERVAL: 474 MS
QTC INTERVAL: 515 MS
T WAVE AXIS: 259 DEGREES
VENTRICULAR RATE: 71 BPM

## 2019-07-26 NOTE — DISCHARGE SUMMARY
Discharge Summary - [facility] Critical Care    Patient Information: Arron Garcia 60 y o  female MRN: [id number]  Unit/Bed#: ED CT1 Encounter: [id number]    Discharging Physician / Practitioner: ESTEBAN Randall  PCP: Cherelle Aguilar MD    Admission Date: 7/13/2019  Discharge Date: 7/13/2019     Reason for Admission: s/p Cardiac Arrest    Discharge Diagnoses:   Principal Problem:    Cardiac arrest [facility])  Active Problems:    Cardiogenic shock ([facility] [address] )    [facility] )    Shock liver    KERRI (acute kidney injury) ([facility] )    Hypokalemia    Hyponatremia    Hyperglycemia  Resolved Problems:    * No resolved hospital problems  *      Consultations During Hospital Stay:  · None     Procedures Performed:     · None    Significant Findings:     · None    Incidental Findings:   · None     Test Results Pending at Discharge (will require follow up): · None     Outpatient Tests Requested:  · None    Complications:  None    Hospital Course: Arron Garcia is a 60 y o  female patient who originally presented to the ED on 7/13/2019 due to cardiac arrest  Patient found down at home by her   Per reports by ED the patients  saw her get up to go to the bathroom, clutch her chest and fell back and became unresponsive  Per reports  initiated CPR and was taken over by police and EMS  911 call was placed at 0424, first monitor strip was asystole at 0442 and first ROSC rhythm strip was at 0500  Leaving potential down time close to 45 minutes  After a thorough exam it was determined the patient had little neurologic function secondary to her prolonged down time  CT scan revealed a diffuse anoxic injury  Discussions were held with pt  and son and they made the decision to make her comfort care  Patient was terminally extubated and pronounced dead at 46   After further review it is unclear what was the cause of patients arrest, there was no ST elevations and diffuse changes consistent with global hypoxia likely from prolonged down time  There is no definitive MI present and cause of death is therefore undetermined and may have been from an arrhythmia or possible PE  Condition at Discharge:      Discharge Day Visit / Exam:     * Please refer to separate progress for these details *    Discharge instructions/Information to patient and family:   See after visit summary for information provided to patient and family  Provisions for Follow-Up Care:  See after visit summary for information related to follow-up care and any pertinent home health orders  Disposition:     Planned Readmission: none    Discharge Statement:  I spent 20 minutes discharging the patient  This time was spent on the day of discharge  I had direct contact with the patient on the day of discharge  Greater than 50% of the total time was spent examining patient, answering all family questions, arranging and discussing plan of care with family         ESTEBAN Florez  2019  2:07 PM

## 2019-07-30 PROBLEM — I21.4 MI, ACUTE, NON ST SEGMENT ELEVATION (HCC): Status: RESOLVED | Noted: 2019-01-01 | Resolved: 2019-07-30
